# Patient Record
Sex: MALE | Race: WHITE | NOT HISPANIC OR LATINO | URBAN - METROPOLITAN AREA
[De-identification: names, ages, dates, MRNs, and addresses within clinical notes are randomized per-mention and may not be internally consistent; named-entity substitution may affect disease eponyms.]

---

## 2017-10-16 ENCOUNTER — RECORDS - HEALTHEAST (OUTPATIENT)
Dept: LAB | Facility: CLINIC | Age: 52
End: 2017-10-16

## 2017-10-16 LAB
CHOLEST SERPL-MCNC: 143 MG/DL
FASTING STATUS PATIENT QL REPORTED: NORMAL
HDLC SERPL-MCNC: 47 MG/DL
LDLC SERPL CALC-MCNC: 86 MG/DL
PSA SERPL-MCNC: 0.7 NG/ML (ref 0–3.5)
TRIGL SERPL-MCNC: 52 MG/DL

## 2019-08-21 ENCOUNTER — RECORDS - HEALTHEAST (OUTPATIENT)
Dept: LAB | Facility: CLINIC | Age: 54
End: 2019-08-21

## 2019-08-21 LAB
ALBUMIN SERPL-MCNC: 3.7 G/DL (ref 3.5–5)
ALP SERPL-CCNC: 62 U/L (ref 45–120)
ALT SERPL W P-5'-P-CCNC: 23 U/L (ref 0–45)
ANION GAP SERPL CALCULATED.3IONS-SCNC: 9 MMOL/L (ref 5–18)
AST SERPL W P-5'-P-CCNC: 25 U/L (ref 0–40)
BILIRUB SERPL-MCNC: 0.7 MG/DL (ref 0–1)
BUN SERPL-MCNC: 9 MG/DL (ref 8–22)
CALCIUM SERPL-MCNC: 9.6 MG/DL (ref 8.5–10.5)
CHLORIDE BLD-SCNC: 108 MMOL/L (ref 98–107)
CHOLEST SERPL-MCNC: 160 MG/DL
CO2 SERPL-SCNC: 25 MMOL/L (ref 22–31)
CREAT SERPL-MCNC: 0.92 MG/DL (ref 0.7–1.3)
FASTING STATUS PATIENT QL REPORTED: NORMAL
GFR SERPL CREATININE-BSD FRML MDRD: >60 ML/MIN/1.73M2
GLUCOSE BLD-MCNC: 84 MG/DL (ref 70–125)
HDLC SERPL-MCNC: 51 MG/DL
LDLC SERPL CALC-MCNC: 92 MG/DL
POTASSIUM BLD-SCNC: 3.8 MMOL/L (ref 3.5–5)
PROT SERPL-MCNC: 6.8 G/DL (ref 6–8)
PSA SERPL-MCNC: 1.1 NG/ML (ref 0–3.5)
SODIUM SERPL-SCNC: 142 MMOL/L (ref 136–145)
TRIGL SERPL-MCNC: 85 MG/DL

## 2020-08-25 ENCOUNTER — RECORDS - HEALTHEAST (OUTPATIENT)
Dept: LAB | Facility: CLINIC | Age: 55
End: 2020-08-25

## 2020-08-25 ENCOUNTER — HOSPITAL ENCOUNTER (OUTPATIENT)
Dept: LAB | Age: 55
Setting detail: SPECIMEN
Discharge: HOME | End: 2020-08-25

## 2020-08-25 LAB
ALBUMIN SERPL-MCNC: 3.7 G/DL (ref 3.5–5)
ALP SERPL-CCNC: 61 U/L (ref 45–120)
ALT SERPL W P-5'-P-CCNC: 16 U/L (ref 0–45)
ANION GAP SERPL CALCULATED.3IONS-SCNC: 9 MMOL/L (ref 5–18)
AST SERPL W P-5'-P-CCNC: 22 U/L (ref 0–40)
BILIRUB SERPL-MCNC: 0.9 MG/DL (ref 0–1)
BUN SERPL-MCNC: 11 MG/DL (ref 8–22)
CALCIUM SERPL-MCNC: 9.4 MG/DL (ref 8.5–10.5)
CHLORIDE BLD-SCNC: 107 MMOL/L (ref 98–107)
CHOLEST SERPL-MCNC: 161 MG/DL
CO2 SERPL-SCNC: 25 MMOL/L (ref 22–31)
CREAT SERPL-MCNC: 0.85 MG/DL (ref 0.7–1.3)
FASTING STATUS PATIENT QL REPORTED: NORMAL
GFR SERPL CREATININE-BSD FRML MDRD: >60 ML/MIN/1.73M2
GLUCOSE BLD-MCNC: 69 MG/DL (ref 70–125)
HDLC SERPL-MCNC: 41 MG/DL
LDLC SERPL CALC-MCNC: 94 MG/DL
POTASSIUM BLD-SCNC: 4 MMOL/L (ref 3.5–5)
PROT SERPL-MCNC: 6.8 G/DL (ref 6–8)
PSA SERPL-MCNC: 1.3 NG/ML (ref 0–3.5)
SODIUM SERPL-SCNC: 141 MMOL/L (ref 136–145)
TRIGL SERPL-MCNC: 129 MG/DL

## 2020-08-26 LAB — COVID-19 ANTIBODY IGG: NEGATIVE

## 2020-11-20 RX ORDER — CETIRIZINE HYDROCHLORIDE 10 MG/1
10 TABLET ORAL DAILY
COMMUNITY

## 2020-11-20 ASSESSMENT — MIFFLIN-ST. JEOR: SCORE: 2454.25

## 2020-11-20 NOTE — PROGRESS NOTES
"Sergio Long is a 55 year old male who is being evaluated via a billable telephone visit.      The patient has been notified of following:     \"This telephone visit will be conducted via a call between you and your physician/provider. We have found that certain health care needs can be provided without the need for a physical exam.  This service lets us provide the care you need with a short phone conversation.  If a prescription is necessary we can send it directly to your pharmacy.  If lab work is needed we can place an order for that and you can then stop by our lab to have the test done at a later time.    Telephone visits are billed at different rates depending on your insurance coverage. During this emergency period, for some insurers they may be billed the same as an in-person visit.  Please reach out to your insurance provider with any questions.    If during the course of the call the physician/provider feels a telephone visit is not appropriate, you will not be charged for this service.\"    Patient has given verbal consent for Telephone visit?  Yes    What phone number would you like to be contacted at? [phone]    How would you like to obtain your AVS? Mail a copy    Virtual visit to establish care for history of obstructive sleep apnea managed with CPAP, would need an interest in replacement parts and equipment.    Assessment:  -Moderate obstructive sleep apnea without sleep associated hypoxemia  -Morbid obesity    Plan:  -Clinically, his obstructive sleep apnea appears very well controlled with CPAP auto titrate 8-12.6 cm H2O and he is noting significant benefit in using on a nightly basis.  -I did not see a strong need for repeat sleep testing at this time given that his weight is actually decreased since his split-night PSG in November 2015.  -Orders placed for replacement CPAP on same settings and new supplies.  -Plan for follow-up in 1-2 years.    55-year-old male with past medical history of " "obstructive sleep apnea, basal cell carcinoma of the neck, malignant melanoma of the trunk.    Last office visit with sleep medicine was at the [facility] on January 21, 2016.  He was noted that he was initially diagnosed in 2009 had been on a CPAP of 10 cm of water pressure.  He had a repeat titration PSG and was interested in restarting CPAP on a pressure of 9 cm H2O.  It seemed to be very effective with him sleeping better, feeling more rested, possibly some mild residual snoring.  Plan to adjust his machine to auto titrate 8-12 cm H2O and follow-up in 1 year along with weight management (weight 345 pounds).    Today, he presents to establish care in order to get replacement supplies and also interested in getting an replacement CPAP machine.  Overall he feels he continues to sleep very well, feels very well rested, no residual snoring or apnea.  He is overall very happy with that and feels it is a \"life changer\".    CPAP download reviewed from October 21, 2020 through November 19, 2020 on auto titrate 8-12.6 cm H2O.  Used 30 out of 30 days.  Average daily usage 6 hours and 35 minutes.  Pressure median 9.6, 95th percentile 12.3 cm H2O.  AHI 0.3.  Leak 95th percentile 2.3 L/min.    Per patient most recent BMI 39.1.    Prior sleep testing:  -Split-night PSG on November 4, 2015.  BMI 41.3.  AHI 19.  CPAP titrated from 5 up to 9 cm of water.  No PLM's observed.    10 point ROS of systems including Constitutional, Eyes, Respiratory, Cardiovascular, Gastroenterology, Genitourinary, Integumentary, Muscularskeletal, Psychiatric were all negative except for pertinent positives noted in my HPI.    Phone call duration: 25 minutes    Pedro Zambrano MA      "

## 2020-11-23 ENCOUNTER — VIRTUAL VISIT (OUTPATIENT)
Dept: SLEEP MEDICINE | Facility: CLINIC | Age: 55
End: 2020-11-23
Payer: COMMERCIAL

## 2020-11-23 VITALS — HEIGHT: 77 IN | BODY MASS INDEX: 37.19 KG/M2 | WEIGHT: 315 LBS

## 2020-11-23 DIAGNOSIS — G47.33 OSA (OBSTRUCTIVE SLEEP APNEA): Primary | ICD-10-CM

## 2020-11-23 PROBLEM — C43.59: Status: ACTIVE | Noted: 2020-11-23

## 2020-11-23 PROBLEM — C44.41 BASAL CELL CARCINOMA OF NECK: Status: ACTIVE | Noted: 2020-11-23

## 2020-11-23 PROCEDURE — 99202 OFFICE O/P NEW SF 15 MIN: CPT | Mod: 95 | Performed by: FAMILY MEDICINE

## 2020-11-23 ASSESSMENT — MIFFLIN-ST. JEOR: SCORE: 2454.37

## 2020-11-23 NOTE — PATIENT INSTRUCTIONS
Your BMI is Body mass index is 39.12 kg/m .  Weight management is a personal decision.  If you are interested in exploring weight loss strategies, the following discussion covers the approaches that may be successful. Body mass index (BMI) is one way to tell whether you are at a healthy weight, overweight, or obese. It measures your weight in relation to your height.  A BMI of 18.5 to 24.9 is in the healthy range. A person with a BMI of 25 to 29.9 is considered overweight, and someone with a BMI of 30 or greater is considered obese. More than two-thirds of American adults are considered overweight or obese.  Being overweight or obese increases the risk for further weight gain. Excess weight may lead to heart disease and diabetes.  Creating and following plans for healthy eating and physical activity may help you improve your health.  Weight control is part of healthy lifestyle and includes exercise, emotional health, and healthy eating habits. Careful eating habits lifelong are the mainstay of weight control. Though there are significant health benefits from weight loss, long-term weight loss with diet alone may be very difficult to achieve- studies show long-term success with dietary management in less than 10% of people. Attaining a healthy weight may be especially difficult to achieve in those with severe obesity. In some cases, medications, devices and surgical management might be considered.  What can you do?  If you are overweight or obese and are interested in methods for weight loss, you should discuss this with your provider.     Consider reducing daily calorie intake by 500 calories.     Keep a food journal.     Avoiding skipping meals, consider cutting portions instead.    Diet combined with exercise helps maintain muscle while optimizing fat loss. Strength training is particularly important for building and maintaining muscle mass. Exercise helps reduce stress, increase energy, and improves fitness.  Increasing exercise without diet control, however, may not burn enough calories to loose weight.       Start walking three days a week 10-20 minutes at a time    Work towards walking thirty minutes five days a week     Eventually, increase the speed of your walking for 1-2 minutes at time    In addition, we recommend that you review healthy lifestyles and methods for weight loss available through the National Institutes of Health patient information sites:  http://win.niddk.nih.gov/publications/index.htm    And look into health and wellness programs that may be available through your health insurance provider, employer, local community center, or becky club.    Weight management plan: Patient was referred to their PCP to discuss a diet and exercise plan.

## 2020-12-14 ENCOUNTER — OFFICE VISIT - HEALTHEAST (OUTPATIENT)
Dept: UROLOGY | Facility: CLINIC | Age: 55
End: 2020-12-14

## 2020-12-14 DIAGNOSIS — N13.2 HYDRONEPHROSIS WITH URINARY OBSTRUCTION DUE TO URETERAL CALCULUS: ICD-10-CM

## 2020-12-14 DIAGNOSIS — N20.0 CALCULUS OF KIDNEY: ICD-10-CM

## 2020-12-14 DIAGNOSIS — N20.1 CALCULUS OF URETER: ICD-10-CM

## 2020-12-18 ENCOUNTER — DOCUMENTATION ONLY (OUTPATIENT)
Dept: SLEEP MEDICINE | Facility: CLINIC | Age: 55
End: 2020-12-18

## 2020-12-18 NOTE — PROGRESS NOTES
Patient was offered choice of vendor and chose [facility].  Patient Sergio Long was set up at [facility] on December 18, 2020. Patient received a Resmed AirSense 10 Auto. Pressures were set at 8-12.6 cm H2O.   Patient s ramp is 4 cm H2O for Auto and FLEX/EPR is EPR, 2.  Patient received a [employer] Mask name: Vitera  Full Face mask size Large, heated tubing and heated humidifier.  Patient does not need to meet compliance. Patient has a follow up not scheduled with Dr. Wagner.    Radha Smith

## 2020-12-21 ENCOUNTER — DOCUMENTATION ONLY (OUTPATIENT)
Dept: SLEEP MEDICINE | Facility: CLINIC | Age: 55
End: 2020-12-21

## 2020-12-21 NOTE — PROGRESS NOTES
3 DAY STM VISIT      Confirmed with patient at time of call- N/A Patient is still interested in STM service     Data only recheck    Replacement device: Yes  STM ordered by provider: Yes     Device type: Auto-CPAP  PAP settings from order::  CPAP min 8 cm  H20       CPAP max 12.6 cm  H20        Mask type:    Nasal Mask     Device settings from machine      Min CPAP 8.0            Max CPAP 12.6             EPR level Setting: TWO      RESMED Soft response setting:  OFF  Assessment: Nightly usage over four hours.  Action plan: Patient removed from STM, STM not required or ordered.       Total time spent on accessing and  interpreting remote patient PAP therapy data  10 minutes  Total time spent counseling, coaching  and reviewing PAP therapy data with patient  0 minutes  [id number] no

## 2020-12-22 ENCOUNTER — COMMUNICATION - HEALTHEAST (OUTPATIENT)
Dept: UROLOGY | Facility: CLINIC | Age: 55
End: 2020-12-22

## 2020-12-23 ENCOUNTER — AMBULATORY - HEALTHEAST (OUTPATIENT)
Dept: UROLOGY | Facility: CLINIC | Age: 55
End: 2020-12-23

## 2020-12-23 ENCOUNTER — COMMUNICATION - HEALTHEAST (OUTPATIENT)
Dept: UROLOGY | Facility: CLINIC | Age: 55
End: 2020-12-23

## 2020-12-23 DIAGNOSIS — N20.0 CALCULUS OF KIDNEY: ICD-10-CM

## 2020-12-28 ENCOUNTER — HOSPITAL ENCOUNTER (OUTPATIENT)
Dept: ULTRASOUND IMAGING | Facility: CLINIC | Age: 55
Discharge: HOME | End: 2020-12-28
Attending: SPECIALIST

## 2020-12-28 DIAGNOSIS — N20.0 CALCULUS OF KIDNEY: ICD-10-CM

## 2020-12-30 ENCOUNTER — OFFICE VISIT - HEALTHEAST (OUTPATIENT)
Dept: UROLOGY | Facility: CLINIC | Age: 55
End: 2020-12-30

## 2020-12-30 DIAGNOSIS — N20.1 CALCULUS OF URETER: ICD-10-CM

## 2020-12-30 DIAGNOSIS — R31.29 OTHER MICROSCOPIC HEMATURIA: ICD-10-CM

## 2020-12-30 DIAGNOSIS — N20.0 CALCULUS OF KIDNEY: ICD-10-CM

## 2020-12-30 DIAGNOSIS — N28.89 RENAL MASS: ICD-10-CM

## 2021-01-05 ENCOUNTER — HOSPITAL ENCOUNTER (OUTPATIENT)
Dept: CT IMAGING | Facility: CLINIC | Age: 56
Discharge: HOME | End: 2021-01-05
Attending: SPECIALIST

## 2021-01-05 DIAGNOSIS — N28.89 RENAL MASS: ICD-10-CM

## 2021-01-05 DIAGNOSIS — N20.1 CALCULUS OF URETER: ICD-10-CM

## 2021-01-06 ENCOUNTER — OFFICE VISIT - HEALTHEAST (OUTPATIENT)
Dept: UROLOGY | Facility: CLINIC | Age: 56
End: 2021-01-06

## 2021-01-06 DIAGNOSIS — N20.0 CALCULUS OF KIDNEY: ICD-10-CM

## 2021-01-06 DIAGNOSIS — R35.0 URINARY FREQUENCY: ICD-10-CM

## 2021-01-06 DIAGNOSIS — N20.1 CALCULUS OF URETER: ICD-10-CM

## 2021-01-12 ENCOUNTER — COMMUNICATION - HEALTHEAST (OUTPATIENT)
Dept: UROLOGY | Facility: CLINIC | Age: 56
End: 2021-01-12

## 2021-01-25 ENCOUNTER — COMMUNICATION - HEALTHEAST (OUTPATIENT)
Dept: UROLOGY | Facility: CLINIC | Age: 56
End: 2021-01-25

## 2021-01-25 DIAGNOSIS — N20.1 CALCULUS OF URETER: ICD-10-CM

## 2021-03-07 ENCOUNTER — RECORDS - HEALTHEAST (OUTPATIENT)
Dept: ADMINISTRATIVE | Facility: OTHER | Age: 56
End: 2021-03-07

## 2021-03-26 ENCOUNTER — OFFICE VISIT - HEALTHEAST (OUTPATIENT)
Dept: UROLOGY | Facility: CLINIC | Age: 56
End: 2021-03-26

## 2021-03-26 DIAGNOSIS — N20.0 CALCULUS OF KIDNEY: ICD-10-CM

## 2021-03-26 DIAGNOSIS — Z87.442 HISTORY OF KIDNEY STONES: ICD-10-CM

## 2021-03-26 DIAGNOSIS — R82.992 HYPEROXALURIA: ICD-10-CM

## 2021-03-26 DIAGNOSIS — N23 RENAL COLIC: ICD-10-CM

## 2021-03-26 DIAGNOSIS — R82.998 HIGH URINE SODIUM: ICD-10-CM

## 2021-03-26 DIAGNOSIS — R82.994 HYPERCALCIURIA: ICD-10-CM

## 2021-03-29 ENCOUNTER — SURGERY - HEALTHEAST (OUTPATIENT)
Dept: UROLOGY | Facility: CLINIC | Age: 56
End: 2021-03-29

## 2021-03-29 DIAGNOSIS — N20.0 CALCULUS OF KIDNEY: ICD-10-CM

## 2021-03-30 ENCOUNTER — COMMUNICATION - HEALTHEAST (OUTPATIENT)
Dept: LAB | Facility: CLINIC | Age: 56
End: 2021-03-30

## 2021-03-31 ENCOUNTER — AMBULATORY - HEALTHEAST (OUTPATIENT)
Dept: SURGERY | Facility: AMBULATORY SURGERY CENTER | Age: 56
End: 2021-03-31

## 2021-03-31 DIAGNOSIS — Z11.59 ENCOUNTER FOR SCREENING FOR OTHER VIRAL DISEASES: ICD-10-CM

## 2021-04-06 ENCOUNTER — AMBULATORY - HEALTHEAST (OUTPATIENT)
Dept: LAB | Facility: CLINIC | Age: 56
End: 2021-04-06

## 2021-04-06 DIAGNOSIS — Z11.59 ENCOUNTER FOR SCREENING FOR OTHER VIRAL DISEASES: ICD-10-CM

## 2021-04-07 LAB
SARS-COV-2 PCR COMMENT: NORMAL
SARS-COV-2 RNA SPEC QL NAA+PROBE: NEGATIVE
SARS-COV-2 VIRUS SPECIMEN SOURCE: NORMAL

## 2021-04-07 ASSESSMENT — MIFFLIN-ST. JEOR: SCORE: 2449.25

## 2021-04-08 ENCOUNTER — ANESTHESIA - HEALTHEAST (OUTPATIENT)
Dept: SURGERY | Facility: AMBULATORY SURGERY CENTER | Age: 56
End: 2021-04-08

## 2021-04-08 ENCOUNTER — COMMUNICATION - HEALTHEAST (OUTPATIENT)
Dept: SCHEDULING | Facility: CLINIC | Age: 56
End: 2021-04-08

## 2021-04-09 ENCOUNTER — SURGERY - HEALTHEAST (OUTPATIENT)
Dept: SURGERY | Facility: AMBULATORY SURGERY CENTER | Age: 56
End: 2021-04-09

## 2021-04-09 ASSESSMENT — MIFFLIN-ST. JEOR: SCORE: 2449.25

## 2021-04-14 ENCOUNTER — AMBULATORY - HEALTHEAST (OUTPATIENT)
Dept: UROLOGY | Facility: CLINIC | Age: 56
End: 2021-04-14

## 2021-04-14 DIAGNOSIS — N20.0 CALCULUS OF KIDNEY: ICD-10-CM

## 2021-04-15 ENCOUNTER — AMBULATORY - HEALTHEAST (OUTPATIENT)
Dept: UROLOGY | Facility: CLINIC | Age: 56
End: 2021-04-15

## 2021-05-01 ENCOUNTER — HEALTH MAINTENANCE LETTER (OUTPATIENT)
Age: 56
End: 2021-05-01

## 2021-05-06 ENCOUNTER — HOSPITAL ENCOUNTER (OUTPATIENT)
Dept: CT IMAGING | Facility: CLINIC | Age: 56
Discharge: HOME | End: 2021-05-06
Attending: PHYSICIAN ASSISTANT
Payer: COMMERCIAL

## 2021-05-06 DIAGNOSIS — N20.1 CALCULUS OF URETER: ICD-10-CM

## 2021-05-07 ENCOUNTER — OFFICE VISIT - HEALTHEAST (OUTPATIENT)
Dept: UROLOGY | Facility: CLINIC | Age: 56
End: 2021-05-07

## 2021-05-07 DIAGNOSIS — R82.994 HYPERCALCIURIA: ICD-10-CM

## 2021-05-07 DIAGNOSIS — N28.89 CALYCEAL DIVERTICULUM: ICD-10-CM

## 2021-05-07 DIAGNOSIS — Z87.442 HISTORY OF KIDNEY STONES: ICD-10-CM

## 2021-05-07 DIAGNOSIS — N20.0 CALCULUS OF KIDNEY: ICD-10-CM

## 2021-05-07 DIAGNOSIS — R82.998 HIGH URINE SODIUM: ICD-10-CM

## 2021-05-07 DIAGNOSIS — R82.992 HYPEROXALURIA: ICD-10-CM

## 2021-05-19 ENCOUNTER — RECORDS - HEALTHEAST (OUTPATIENT)
Dept: SURGERY | Facility: CLINIC | Age: 56
End: 2021-05-19

## 2021-05-19 ENCOUNTER — AMBULATORY - HEALTHEAST (OUTPATIENT)
Dept: LAB | Facility: CLINIC | Age: 56
End: 2021-05-19

## 2021-05-19 DIAGNOSIS — Z87.442 HISTORY OF KIDNEY STONES: ICD-10-CM

## 2021-05-19 DIAGNOSIS — N28.89 CALYCEAL DIVERTICULUM: ICD-10-CM

## 2021-05-19 LAB
ALBUMIN UR-MCNC: NEGATIVE G/DL
APPEARANCE UR: CLEAR
BACTERIA #/AREA URNS HPF: NORMAL /[HPF]
BILIRUB UR QL STRIP: NEGATIVE
COLOR UR AUTO: YELLOW
GLUCOSE UR STRIP-MCNC: NEGATIVE MG/DL
HGB UR QL STRIP: NEGATIVE
KETONES UR STRIP-MCNC: NEGATIVE MG/DL
LEUKOCYTE ESTERASE UR QL STRIP: NEGATIVE
NITRATE UR QL: NEGATIVE
PH UR STRIP: 7 [PH] (ref 5–8)
RBC #/AREA URNS AUTO: NORMAL HPF
SP GR UR STRIP: 1.02 (ref 1–1.03)
SQUAMOUS #/AREA URNS AUTO: NORMAL LPF
UROBILINOGEN UR STRIP-ACNC: NORMAL
WBC #/AREA URNS AUTO: NORMAL HPF

## 2021-06-05 VITALS — BODY MASS INDEX: 37.19 KG/M2 | WEIGHT: 315 LBS | HEIGHT: 77 IN

## 2021-06-13 NOTE — PATIENT INSTRUCTIONS - HE
Patient Stated Goal: Pass my stone  Symptom Control While Passing A Stone    The goal of [facility] is to let a smaller kidney stone (less than 4 to 5 mm) pass without intervention if possible. Giving your body a chance to clear the stone may take a few hours up to a few weeks.  Keeping you well-informed, safe and fairly comfortable is important.    Drink to thirst  Do not attempt to  flush out  your stone by drinking too much fluid. This does not work and may increase nausea. Drink enough to satisfy your body s thirst. Eating your normal diet is fine.   Medications (that may be suggested or prescribed)    Ibuprofen (Advil or Motrin) Available over the counter  o Take two (200mg) tablets every six hours until the stone passes.  o Prevents spasm of the ureter.    o Decreases pain.      Dramamine* (drowsy version, non-generic formulation) Available over the counter  o Take 50mg at bedtime  o Decreases spasms of the ureter  o Decreases nausea  o Decreases acute pain  o Decreases recurrence of pain for 24 hours  o Will help you sleep  *This medication will cause increase drowsiness, do not drive or operate machinery for 6 hours.      Narcotics (Percocet, Vicodin, Dilaudid) Take as prescribed for severe pain unrelieved by ibuprofen and Dramamine  o Narcotics have significant side effects and only  cover-up  pain. They have no effect on the cause of pain.  o Common side effects  - Confusion, disorientation and sedation - DO NOT DRIVE OR OPERATE MACHINERY WITHIN 24 HOURS  - Nausea - take Dramamine or Zofran or Haldol to help control  - Constipation  - Sleep disturbances      Ondansetron (Zofran) Take as prescribed  o Reserve for severe nausea  o May cause constipation, start over the counter Miralax if needed      Second Line Anti-Nausea Medication: Adding a different anti-nausea medication maybe helpful for persistent nausea.  The combined effect of different types of anti-nausea medications maybe more  effective than either medication by itself, even in higher doses.  o Compazine: Take as prescribed      Information about kidney stones    Crystals can form if chemicals are too concentrated in your urine. If the crystal grows over time, a stone may form. A stone usually isn t painful while it is still in the kidney.    As the stone begins to leave the kidney, you may experience episodes of flank pain as the kidney stone approaches the entrance to the ureter. Some people feel a vague ache in the side.    Kidney stones may fall into the ureter. Some stones are tiny and pass through without causing symptoms. The ureter is a small tube (approximately 1/8 of an inch wide). A kidney stone can get stuck and block the ureter. If this happens, urine backs up and flows back to the kidney. Back pressure on the kidney can cause:  o Severe flank pain radiating to the groin.  o Severe nausea and vomiting.  o The pain can occur in the lower back, side, groin or all three.      When the stone reaches the lower ureter, this can irritate the bladder and sensations of feeling the urge to urinate frequently and urgently may occur.      Once the stone passes out of your ureter and into your bladder, the symptoms of urgency and frequency will often disappear. Sometimes pain will come back for a short period and will not be as severe as before. The passage of the stone from your bladder and out of your body is usually not a problem. The urethra is at least twice as wide as the normal ureter, so the stone doesn t usually block it.    Strain all urine  If you pass the stone, save it. Place it in the container we have provided and bring it to the [facility] within a week of passing it. Your stone will then be sent for analysis which takes about a month.     Signs and symptoms you might experience    Nausea    Decreased appetite    Urinary frequency    Bloody urine     Chills    Fatigue    When to call [facility] or  go to the Emergency Room    Fever with a temperature greater than 100.1    Severe pain    Persistent nausea/vomiting    If the pain worsens or nausea/vomiting is uncontrolled with medications, STOP eating & drinking. You need to have an empty stomach for 8 hours prior to surgery. Call the [facility] immediately at [phone].           Follow-up    Low dose CT scan with doctor visit 1-2 weeks after initial clinic visit per doctor s instructions    Please cancel the CT scan visit if you pass a stone. Reschedule for a one month follow-up with doctor to discuss stone composition and future prevention.    Preventing future stones    Approximately a month after your stone is sent out for analysis, a prevention visit will occur with your provider, to discuss an individualized plan for prevention of new stones and to discuss managing stones that you may still have. Along with the analysis of the kidney stone, blood and urine tests may be indicated to develop this plan. Knowing the type of kidney stones you make, and why, allows the providers at the [facility] to recommend specific ways to prevent them.    Follow-up visits are an important part of monitoring and preventing future re-occurrences.    The [facility] is available for questions or concerns 24 hours a day at [phone]

## 2021-06-13 NOTE — TELEPHONE ENCOUNTER
[facility] clinical support following up on ureteral stone trial of passage.  Patient states he was having some significant pain a few days after his last appointment encounter but that has finally settled.  He is feeling rather well now.  No stone retrieved as of yet.  He has an upcoming follow up appointment with CT but his health coverage is denying coverage.  Will have provider put in a different covered imaging as necessary and schedulers will contact patient with changes.

## 2021-06-13 NOTE — PROGRESS NOTES
"Assessment/Plan:        Diagnoses and all orders for this visit:    Calculus of ureter  -     Symptom Control While Passing a Stone Education  -     CT Abdomen Pelvis Without Oral Without IV Contrast; Future; Expected date: 12/28/2020  -     Patient Stated Goal: Pass my stone  -     tamsulosin (FLOMAX) 0.4 mg cap; Take 1 capsule (0.4 mg total) by mouth daily for 14 days. With meal  Dispense: 14 capsule; Refill: 1    Hydronephrosis with urinary obstruction due to ureteral calculus    Calculus of kidney    Stone Management Plan  [facility] 12/14/2020   Urinary Tract Infection No suspicion of infection   Renal Colic Asymptomatic at this time   Renal Failure No suspicion of renal failure   Current CT date 12/13/2020   Right sided stones? Yes   R Number of ureteral stones 1   R GSD of ureteral stones 4   R Location of ureteral stone Proximal   R Number of kidney stones  1   R GSD of kidney stones 10 - 15   R Hydronephrosis Moderate   R Stone Event New event   Diagnosis date 12/13/2020   Initial location of primary symptomatic stone Proximal   Initial GSD of primary symptomatic stone 4   R MET status Initiation   R Current Plan MET   MET 2 week F/U   Left sided stones? Yes   L Number of ureteral stones No ureteral stones   L Number of kidney stones  1   L GSD of kidney stones 2 - 4   L Hydronephrosis None   L Stone Event No current event   L Current Plan Observe   Observe rationale Limited stone burden with good prognosis for spontaneous passage         Phone call duration: 23 minutes    Genia Rebolledo PA-C    Subjective:        The patient has been notified of following:     \"This telephone visit will be conducted via a call between you and your physician/provider. We have found that certain health care needs can be provided without the need for a physical exam.  This service lets us provide the care you need with a short phone conversation. If a prescription is necessary, we can send it directly to your " "pharmacy.  If labs and/or imaging are needed, we can place orders so that you can have the test (s) done at a later time.    If during the course of the call the physician/provider feels a telephone visit is not appropriate, you will not be charged for this service.\"     HPI  Mr. Sergio Long is a 55 y.o.  male who is being evaluated via a billable telephone visit by [facility] following [facility] ER visit for urolithiasis.    He is a first time stone former. He has no identified modifiable stone risk factors. He has no identified non-modifiable stone risk factors.    He was seen in ER yesterday for acute onset right flank pain x 2 hours in duration. The pain was 10/10 and sharp without radiation. He could alleviate the pain by momentarily repositioning. He had associated nausea and reported hematuria 1 week ago that resolved. Workup was notable for CT reporting an obstructing right ureteral stone, bilateral renal stones and indeterminate right renal cystic lesion. He was sent home with dilaudid.    He is asymptomatic at present. He denies symptoms of fever, chills, flank pain, nausea, vomiting, urinary frequency and dysuria.     CT scan from 12/13/20 is personally reviewed and demonstrates a moderately obstructing 4 mm right proximal ureteral stone. Additional, nonobstructing 12 mm right renal stone and 2 mm left renal stone. Radiologist notes an ovoid 4 cm cystic lesion along anterior aspect of right kidney.    Significant labs from presentation include severe hematuria, no pyuria, negative nitrite, no bacteria, normal WBC, normal C reactive protein, normal creatinine and normal potassium.    PLAN    54 yo M with obstructing right proximal ureteral stone. Bilateral renal stones. Probably complex right renal cyst.    Will proceed with medical expulsive therapy. Risks and benefits were detailed of medical expulsive therapy including probability of stone passage, recurrent renal " colic, and requirement of emergency medical and/or surgical care and further imaging. Patient verbalized understanding. Patient agrees with plan as discussed. He will return in 2 weeks with low dose CT scan.    For symptom control, he was prescribed flomax. He has dilaudid. Over the counter symptom control medications of ibuprofen, Dramamine and Tylenol were recommended.     ROS   A 12 point comprehensive review of systems is negative except for HPI    Past Medical History:   Diagnosis Date     Sleep apnea        No past surgical history on file.    Current Outpatient Medications   Medication Sig Dispense Refill     CETIRIZINE HCL (ZYRTEC ORAL) Take 1 tablet by mouth daily as needed.       dimenhyDRINATE (DRAMAMINE) 50 MG tablet Take 1 tablet (50 mg total) by mouth at bedtime for 7 days. 7 tablet 0     dimenhyDRINATE (DRAMAMINE) 50 MG tablet Take 1 tablet (50 mg total) by mouth 4 (four) times a day as needed. 28 tablet 0     HYDROmorphone (DILAUDID) 2 MG tablet Take 1-2 tablets (2-4 mg total) by mouth every 4 (four) hours as needed for pain. 11 tablet 0     ibuprofen (ADVIL,MOTRIN) 200 MG tablet Take 2 tablets (400 mg total) by mouth 4 (four) times a day for 7 days. 56 tablet 0     tamsulosin (FLOMAX) 0.4 mg cap Take 1 capsule (0.4 mg total) by mouth daily for 14 days. With meal 14 capsule 1     No current facility-administered medications for this visit.        No Known Allergies    Social History     Socioeconomic History     Marital status:      Spouse name: Not on file     Number of children: Not on file     Years of education: Not on file     Highest education level: Not on file   Occupational History     Not on file   Social Needs     Financial resource strain: Not on file     Food insecurity     Worry: Not on file     Inability: Not on file     Transportation needs     Medical: Not on file     Non-medical: Not on file   Tobacco Use     Smoking status: Former Smoker     Types: Cigarettes     Smokeless  tobacco: Current User     Types: Chew   Substance and Sexual Activity     Alcohol use: No     Drug use: Not on file     Sexual activity: Not on file   Lifestyle     Physical activity     Days per week: Not on file     Minutes per session: Not on file     Stress: Not on file   Relationships     Social connections     Talks on phone: Not on file     Gets together: Not on file     Attends Gnosticism service: Not on file     Active member of club or organization: Not on file     Attends meetings of clubs or organizations: Not on file     Relationship status: Not on file     Intimate partner violence     Fear of current or ex partner: Not on file     Emotionally abused: Not on file     Physically abused: Not on file     Forced sexual activity: Not on file   Other Topics Concern     Not on file   Social History Narrative     Not on file       Family History   Problem Relation Age of Onset     Sleep apnea Father      Snoring Father      Sleep apnea Sister      Snoring Sister      Sleep apnea Sister      Snoring Sister      Sleep apnea Sister      Snoring Sister        Objective:      Physical Exam  There were no vitals filed for this visit.    Labs    Urinalysis POC (Office):  Nitrite, UA   Date Value Ref Range Status   12/13/2020 Negative Negative Final       Lab Urinalysis:  Blood, UA   Date Value Ref Range Status   12/13/2020 Large (!) Negative Final     Nitrite, UA   Date Value Ref Range Status   12/13/2020 Negative Negative Final     Leukocytes, UA   Date Value Ref Range Status   12/13/2020 Negative Negative Final     pH, UA   Date Value Ref Range Status   12/13/2020 7.0 4.5 - 8.0 Final    and Acute Labs   CBC   WBC   Date Value Ref Range Status   12/13/2020 5.7 4.0 - 11.0 thou/uL Final     Hemoglobin   Date Value Ref Range Status   12/13/2020 13.5 (L) 14.0 - 18.0 g/dL Final     Platelets   Date Value Ref Range Status   12/13/2020 242 140 - 440 thou/uL Final   , C Reactive Protein    CRP   Date Value Ref Range Status    12/13/2020 0.3 0.0 - 0.8 mg/dL Final    and Renal Panel  [facility]  Creatinine   Date Value Ref Range Status   12/13/2020 0.94 0.70 - 1.30 mg/dL Final   08/25/2020 0.85 0.70 - 1.30 mg/dL Final   08/21/2019 0.92 0.70 - 1.30 mg/dL Final     Potassium   Date Value Ref Range Status   12/13/2020 3.6 3.5 - 5.0 mmol/L Final   08/25/2020 4.0 3.5 - 5.0 mmol/L Final   08/21/2019 3.8 3.5 - 5.0 mmol/L Final     Calcium   Date Value Ref Range Status   12/13/2020 9.2 8.5 - 10.5 mg/dL Final   08/25/2020 9.4 8.5 - 10.5 mg/dL Final   08/21/2019 9.6 8.5 - 10.5 mg/dL Final

## 2021-06-14 NOTE — TELEPHONE ENCOUNTER
Message left for patient regarding vm message on [facility] line regarding passing his stone.  Analysis order put in.  Neema Portillo RN

## 2021-06-14 NOTE — TELEPHONE ENCOUNTER
Order faxed to [facility] to send pt 2 24 hour urine kits.  Neema Portillo RN

## 2021-06-14 NOTE — PROGRESS NOTES
"Assessment/Plan:        Diagnoses and all orders for this visit:    Calculus of kidney    Calculus of ureter  -     CT Abdomen Pelvis Without Oral With Without IV Contrast; Future; Expected date: 01/06/2021    Other microscopic hematuria    Renal mass  -     CT Abdomen Pelvis Without Oral With Without IV Contrast; Future; Expected date: 01/06/2021      Stone Management Plan  [facility] [place] Stone Management 12/14/2020 12/30/2020   Urinary Tract Infection No suspicion of infection No suspicion of infection   Renal Colic Asymptomatic at this time Asymptomatic at this time   Renal Failure No suspicion of renal failure No suspicion of renal failure   Current CT date 12/13/2020 -   Right sided stones? Yes -   R Number of ureteral stones 1 -   R GSD of ureteral stones 4 -   R Location of ureteral stone Proximal -   R Number of kidney stones  1 -   R GSD of kidney stones 10 - 15 -   R Hydronephrosis Moderate -   R Stone Event New event Established event   Diagnosis date 12/13/2020 -   Initial location of primary symptomatic stone Proximal -   Initial GSD of primary symptomatic stone 4 -   R MET status Initiation -   R Current Plan MET -   MET 2 week F/U -   Left sided stones? Yes -   L Number of ureteral stones No ureteral stones -   L Number of kidney stones  1 -   L GSD of kidney stones 2 - 4 -   L Hydronephrosis None -   L Stone Event No current event No current event   L Current Plan Observe -   Observe rationale Limited stone burden with good prognosis for spontaneous passage -             Phone call duration: 12 minutes    Dominguez Palmer MD     Subjective:      The patient has been notified of following:     \"This telephone visit will be conducted via a call between you and your physician/provider. We have found that certain health care needs can be provided without the need for a physical exam.  This service lets us provide the care you need with a short phone conversation.  If a prescription is necessary we can send it " "directly to your pharmacy.  If labs and/or imaging are needed, we can place orders so you can have the test (s) done at a later time.    If during the course of the call the physician/provider feels a telephone visit is not appropriate, you will not be charged for this service.\"     HPI  Mr. Sergio Long is a 55 y.o.  male who is being evaluated via a billable telephone visit by [facility] [place] for follow-up of a 4 mm right proximal ureteral stone on the right diagnosed 12/13/2020.  In addition there is a 12 mm stone in the right kidney nonobstructing and a 2 mm stone in the left kidney nonobstructing.  There is a 4 cm cystic mass that appears to be a complex cyst but cannot rule out neoplasia in the right kidney upper pole.  This was noted by radiology as well.  Patient comes at this time 12/30/2020 having had an ultrasound on 12/28/2020 showing no hydro on either side.  There were no jets seen on the right there was a jet seen on the left.  There was a calcified lesion in the bladder that was either in the orifice on the right or in the bladder.  It appeared larger than what they saw on the ureteral stone.  Jets were seen on the left.    Renal mass had Hounsfield numbers 7-10.    Patient is having no flank pain at this point and has had none for a week or so.  He has noted a feeling of having to void after he is voided but may be an indication that the stone was in the intramural tunnel.  He thinks it is a little worse than it has been over the remote past as he has a sensation of this all the time to a degree.    Impression right ureteral stone either in the intramural tunnel or bladder at this point with known right renal stone 12 mm in size and 2 mm stone in the left kidney  Mass lesion right upper pole indeterminate probably complex cyst need to rule out neoplasia    Plan;; will obtain CT without and with IV contrast with delayed cuts that will answer the question as to the " whereabouts of the right ureteral stone as well as define the mass lesion in the right upper pole.    The large stone in the right kidney may have to be addressed if the cystic mass turns out to be benign.  In addition stone risk studies would be in order in view of his age.          ROS   Review of systems is negative except for HPI.    Past Medical History:   Diagnosis Date     Sleep apnea        No past surgical history on file.    Current Outpatient Medications   Medication Sig Dispense Refill     CETIRIZINE HCL (ZYRTEC ORAL) Take 1 tablet by mouth daily as needed.       HYDROmorphone (DILAUDID) 2 MG tablet Take 1-2 tablets (2-4 mg total) by mouth every 4 (four) hours as needed for pain. 11 tablet 0     No current facility-administered medications for this visit.        No Known Allergies    Social History     Socioeconomic History     Marital status:      Spouse name: Not on file     Number of children: Not on file     Years of education: Not on file     Highest education level: Not on file   Occupational History     Not on file   Social Needs     Financial resource strain: Not on file     Food insecurity     Worry: Not on file     Inability: Not on file     Transportation needs     Medical: Not on file     Non-medical: Not on file   Tobacco Use     Smoking status: Former Smoker     Types: Cigarettes     Smokeless tobacco: Current User     Types: Chew   Substance and Sexual Activity     Alcohol use: No     Drug use: Not on file     Sexual activity: Not on file   Lifestyle     Physical activity     Days per week: Not on file     Minutes per session: Not on file     Stress: Not on file   Relationships     Social connections     Talks on phone: Not on file     Gets together: Not on file     Attends Scientologist service: Not on file     Active member of club or organization: Not on file     Attends meetings of clubs or organizations: Not on file     Relationship status: Not on file     Intimate partner violence      Fear of current or ex partner: Not on file     Emotionally abused: Not on file     Physically abused: Not on file     Forced sexual activity: Not on file   Other Topics Concern     Not on file   Social History Narrative     Not on file       Family History   Problem Relation Age of Onset     Sleep apnea Father      Snoring Father      Sleep apnea Sister      Snoring Sister      Sleep apnea Sister      Snoring Sister      Sleep apnea Sister      Snoring Sister        Objective:      Labs

## 2021-06-14 NOTE — PROGRESS NOTES
"Assessment/Plan:        Diagnoses and all orders for this visit:    Calculus of kidney  24 hour litholink study x 2  Calculus of ureter    Urinary frequency      Stone Management Plan  [facility] Stone Management 12/14/2020 12/30/2020 1/6/2021   Urinary Tract Infection No suspicion of infection No suspicion of infection No suspicion of infection   Renal Colic Asymptomatic at this time Asymptomatic at this time Well controlled symptoms   Renal Failure No suspicion of renal failure No suspicion of renal failure Suspected Renal Failure   Current CT date 12/13/2020 - -   Right sided stones? Yes - Yes   R Number of ureteral stones 1 - 1   R GSD of ureteral stones 4 - 4   R Location of ureteral stone Proximal - Distal   R Number of kidney stones  1 - 1   R GSD of kidney stones 10 - 15 - 10 - 15   R Hydronephrosis Moderate - Mild   R Stone Event New event Established event Established event   Diagnosis date 12/13/2020 - -   Initial location of primary symptomatic stone Proximal - -   Initial GSD of primary symptomatic stone 4 - -   R MET status Initiation - Progression   R Current Plan MET - MET   MET 2 week F/U - (No Data)   Left sided stones? Yes - Yes   L Number of ureteral stones No ureteral stones - -   L Number of kidney stones  1 - Renal stones unchanged from last exam   L GSD of kidney stones 2 - 4 - -   L Hydronephrosis None - -   L Stone Event No current event No current event No current event   L Current Plan Observe - Observe   Observe rationale Limited stone burden with good prognosis for spontaneous passage - Limited stone burden with good prognosis for spontaneous passage             Phone call duration: 19 minutes    Dominguez Palmer MD     Subjective:      The patient has been notified of following:     \"This telephone visit will be conducted via a call between you and your physician/provider. We have found that certain health care needs can be provided without the need for a physical exam.  This service " "lets us provide the care you need with a short phone conversation.  If a prescription is necessary we can send it directly to your pharmacy.  If labs and/or imaging are needed, we can place orders so you can have the test (s) done at a later time.    If during the course of the call the physician/provider feels a telephone visit is not appropriate, you will not be charged for this service.\"     HPI  Mr. Sergio Long is a 55 y.o.  male who is being evaluated via a billable telephone visit by [facility] [place] for follow-up right ureter stone diagnosed 12/13/2020.  In addition 12 millimeter right renal stone  And a 2 mm left renal stone.  Ultrasound obtained 12/28/2020 not  helpfful.  Comes at this time with sxs of frequency variable.  . He is having no flank pain .  Patient Was to have CT scan with and without  Contrast  To evaluate ueteral stone and a complex RUP cyst.  Ct W/O contrast was done.    Personal review of CT W//O contrast demonstrates ureteral stone to be in intramural tunnel with no change in renal stones and mild hydro.  Complex cyst unchanged with HU 7-8.    IMP Right distal ureteral stone R renal stone left renal stone    R complex cyst, simple left renal cyst    Plan  Continue trial of passge ,   litholink stone study  Will get Ct w/o and with contrast and delayed runs in future.                       ROS   Review of systems is negative except for HPI.    Past Medical History:   Diagnosis Date     Sleep apnea        No past surgical history on file.    Current Outpatient Medications   Medication Sig Dispense Refill     CETIRIZINE HCL (ZYRTEC ORAL) Take 1 tablet by mouth daily as needed.       HYDROmorphone (DILAUDID) 2 MG tablet Take 1-2 tablets (2-4 mg total) by mouth every 4 (four) hours as needed for pain. 11 tablet 0     No current facility-administered medications for this visit.        No Known Allergies    Social History     Socioeconomic History     Marital status: "      Spouse name: Not on file     Number of children: Not on file     Years of education: Not on file     Highest education level: Not on file   Occupational History     Not on file   Social Needs     Financial resource strain: Not on file     Food insecurity     Worry: Not on file     Inability: Not on file     Transportation needs     Medical: Not on file     Non-medical: Not on file   Tobacco Use     Smoking status: Former Smoker     Types: Cigarettes     Smokeless tobacco: Current User     Types: Chew   Substance and Sexual Activity     Alcohol use: No     Drug use: Not on file     Sexual activity: Not on file   Lifestyle     Physical activity     Days per week: Not on file     Minutes per session: Not on file     Stress: Not on file   Relationships     Social connections     Talks on phone: Not on file     Gets together: Not on file     Attends Zoroastrian service: Not on file     Active member of club or organization: Not on file     Attends meetings of clubs or organizations: Not on file     Relationship status: Not on file     Intimate partner violence     Fear of current or ex partner: Not on file     Emotionally abused: Not on file     Physically abused: Not on file     Forced sexual activity: Not on file   Other Topics Concern     Not on file   Social History Narrative     Not on file       Family History   Problem Relation Age of Onset     Sleep apnea Father      Snoring Father      Sleep apnea Sister      Snoring Sister      Sleep apnea Sister      Snoring Sister      Sleep apnea Sister      Snoring Sister        Objective:      Labs

## 2021-06-16 NOTE — PROGRESS NOTES
Assessment/Plan:        Diagnoses and all orders for this visit:    Renal colic  -     HYDROmorphone (DILAUDID) 2 MG tablet; Take 1 tablet (2 mg total) by mouth every 6 (six) hours as needed for pain.  Dispense: 10 tablet; Refill: 0  -     Amb Referral to Bariatric Dietician    History of kidney stone  Right renal stone    High urine sodium  -     Amb Referral to Bariatric Dietician    Hyperoxaluria  -     Amb Referral to Bariatric Dietician    Hypercalciuria  -     Amb Referral to Bariatric Dietician            24-hour urine Litholink study 3 to 4 months  Other orders  -     metroNIDAZOLE (METROCREAM) 0.75 % cream; USE 1 APPLICATION AT BEDTIME TOPICALLY TO FACE AT BEDTIME      Stone Management Plan  [facility] Stone Management 12/30/2020 1/6/2021 3/26/2021   Urinary Tract Infection No suspicion of infection No suspicion of infection No suspicion of infection   Renal Colic Asymptomatic at this time Well controlled symptoms Asymptomatic at this time   Renal Failure No suspicion of renal failure Suspected Renal Failure No suspicion of renal failure   Current CT date - - -   Right sided stones? - Yes -   R Number of ureteral stones - 1 -   R GSD of ureteral stones - 4 -   R Location of ureteral stone - Distal -   R Number of kidney stones  - 1 -   R GSD of kidney stones - 10 - 15 -   R Hydronephrosis - Mild -   R Stone Event Established event Established event No current event   Diagnosis date - - -   Initial location of primary symptomatic stone - - -   Initial GSD of primary symptomatic stone - - -   R MET status - Progression Passed   R Current Plan - MET -   MET - (No Data) -   Left sided stones? - Yes -   L Number of ureteral stones - - -   L Number of kidney stones  - Renal stones unchanged from last exam -   L GSD of kidney stones - - -   L Hydronephrosis - - -   L Stone Event No current event No current event No current event   L Current Plan - Observe -   Observe rationale - Limited stone burden with good prognosis  "for spontaneous passage -             Phone call duration: 10 minutes  18 minutes spent chart review review test interpretation of tests placement of tests placement consult with another provider documentation total of 28 minutes     Dominguez Palmer MD     Subjective:      The patient has been notified of following:     \"This telephone visit will be conducted via a call between you and your physician/provider. We have found that certain health care needs can be provided without the need for a physical exam.  This service lets us provide the care you need with a short phone conversation.  If a prescription is necessary we can send it directly to your pharmacy.  If labs and/or imaging are needed, we can place orders so you can have the test (s) done at a later time.    If during the course of the call the physician/provider feels a telephone visit is not appropriate, you will not be charged for this service.\"     HPI  Mr. Sergio Long is a 55 y.o.  male who is being evaluated via a billable telephone visit by [facility] [place] for follow-up left ureteral stone the patient spontaneously in the interval but did not recover as he lost the toilet.  Patient has a known 12 x 6 mm stone in the right renal pelvis lower pole Hounsfield 940 units nonobstructing.    Patient comes at this time for follow-up of 24-hour urine stone studies.  He is completely asymptomatic without abdominal or flank pain or voiding symptoms.    Multiple serum calcium is normal starting out 2019, 9.6 2029.4 August and December 2000 29.2.    Urine volume normal at 2.9 L and 3.0 L saturation calcium oxalate 8.1 and 6.7 urine calcium level 639 and 510 oxalate 49 and 43 sodium 283 and 214 remainder of 24-hour urine unremarkable.    In discussing with patient he uses a great deal of salt in addition he loves cheese curds and eats a large amount of cheese which contains both calcium and sodium.    Impression large right renal " calculus nonobstructing at this time, recent spontaneous passage left ureteral stone  Risk factors hypercalciuria high urinary sodium hyperoxaluria    Plan elective clearance of stone right renal pelvis patient's pleasure  Dietary consultation to give patient counseled on low-sodium diet and low oxalate diet  Repeat 24-hour urine Litholink study 3 to 4 months       ROS   Review of systems is negative except for HPI.    Past Medical History:   Diagnosis Date     Sleep apnea        No past surgical history on file.    Current Outpatient Medications   Medication Sig Dispense Refill     CETIRIZINE HCL (ZYRTEC ORAL) Take 1 tablet by mouth daily as needed.       HYDROmorphone (DILAUDID) 2 MG tablet Take 1-2 tablets (2-4 mg total) by mouth every 4 (four) hours as needed for pain. 11 tablet 0     HYDROmorphone (DILAUDID) 2 MG tablet Take 1 tablet (2 mg total) by mouth every 6 (six) hours as needed for pain. 10 tablet 0     metroNIDAZOLE (METROCREAM) 0.75 % cream USE 1 APPLICATION AT BEDTIME TOPICALLY TO FACE AT BEDTIME       No current facility-administered medications for this visit.        No Known Allergies    Social History     Socioeconomic History     Marital status:      Spouse name: Not on file     Number of children: Not on file     Years of education: Not on file     Highest education level: Not on file   Occupational History     Not on file   Social Needs     Financial resource strain: Not on file     Food insecurity     Worry: Not on file     Inability: Not on file     Transportation needs     Medical: Not on file     Non-medical: Not on file   Tobacco Use     Smoking status: Former Smoker     Types: Cigarettes     Smokeless tobacco: Current User     Types: Chew   Substance and Sexual Activity     Alcohol use: No     Drug use: Not on file     Sexual activity: Not on file   Lifestyle     Physical activity     Days per week: Not on file     Minutes per session: Not on file     Stress: Not on file    Relationships     Social connections     Talks on phone: Not on file     Gets together: Not on file     Attends Gnosticism service: Not on file     Active member of club or organization: Not on file     Attends meetings of clubs or organizations: Not on file     Relationship status: Not on file     Intimate partner violence     Fear of current or ex partner: Not on file     Emotionally abused: Not on file     Physically abused: Not on file     Forced sexual activity: Not on file   Other Topics Concern     Not on file   Social History Narrative     Not on file       Family History   Problem Relation Age of Onset     Sleep apnea Father      Snoring Father      Sleep apnea Sister      Snoring Sister      Sleep apnea Sister      Snoring Sister      Sleep apnea Sister      Snoring Sister        Objective:      Labs   Stone prevention labs 24 hour urine No results found for: [id number], CALCIUMUR, IK72WHB, [id number], FTNQN49R, LABPH, LABURIN

## 2021-06-16 NOTE — ANESTHESIA CARE TRANSFER NOTE
Last vitals:   Vitals:    04/09/21 0952   BP: (!) 164/102   Pulse: 78   Resp: 16   Temp: 36.5  C (97.7  F)   SpO2: 95%     Patient's level of consciousness is drowsy  Spontaneous respirations: yes  Maintains airway independently: yes  Dentition unchanged: yes  Oropharynx: oropharynx clear of all foreign objects    QCDR Measures:  ASA# 20 - Surgical Safety Checklist: WHO surgical safety checklist completed prior to induction    PQRS# 430 - Adult PONV Prevention: 4558F - Pt received => 2 anti-emetic agents (different classes) preop & intraop  ASA# 8 - Peds PONV Prevention: NA - Not pediatric patient, not GA or 2 or more risk factors NOT present  PQRS# 424 - [facility]-op Temp Management: 4559F - At least one body temp DOCUMENTED => 35.5C or 95.9F within required timeframe  PQRS# 426 - PACU Transfer Protocol: - Transfer of care checklist used  ASA# 14 - Acute Post-op Pain: ASA14B - Patient did NOT experience pain >= 7 out of 10

## 2021-06-16 NOTE — ANESTHESIA PREPROCEDURE EVALUATION
Anesthesia Evaluation      Patient summary reviewed   No history of anesthetic complications     Airway   Mallampati: III  Neck ROM: full   Pulmonary     breath sounds clear to auscultation  (+) sleep apnea on CPAP, ,                          Cardiovascular - negative ROS  Exercise tolerance: > or = 4 METS  Rhythm: regular        Neuro/Psych - negative ROS     Endo/Other    (+) obesity,      GI/Hepatic/Renal    Chronic renal disease: stones.     Other findings:     NPO > 8 hrs       Results for ARLEN ERIKA ELLE (MRN [id number]) as of 4/9/2021 4/6/2021 15:41  SARS-CoV-2 PCR Result: NEGATIVE        Dental - normal exam                        Anesthesia Plan  Planned anesthetic: general LMA        Ketamine  Robinul  Zofran, decadron 10 mg      ASA 3   Induction: intravenous   Anesthetic plan and risks discussed with: patient  Anesthesia plan special considerations: antiemetics,   Post-op plan: routine recovery

## 2021-06-16 NOTE — ANESTHESIA POSTPROCEDURE EVALUATION
Patient: Sergio Long  Procedure(s):  CYSTOURETEROSCOPY, WITH RETROGRADE PYELOGRAM, HOLMIUM LASER LITHOTRIPSY OF URETERAL CALCULUS, AND STENT INSERTION RIGHT (Right)  Anesthesia type: general    Patient location: Phase II Recovery  Last vitals:   Vitals Value Taken Time   /94 04/09/21 1057   Temp 36.4  C (97.6  F) 04/09/21 1020   Pulse 54 04/09/21 1108   Resp 16 04/09/21 1030   SpO2 93 % 04/09/21 1108   Vitals shown include unvalidated device data.  Post vital signs: stable  Level of consciousness: awake and responds to simple questions  Post-anesthesia pain: pain controlled  Post-anesthesia nausea and vomiting: no  Pulmonary: unassisted, return to baseline  Cardiovascular: stable and blood pressure at baseline  Hydration: adequate  Anesthetic events: no    QCDR Measures:  ASA# 11 - [facility]ine-op Cardiac Arrest: ASA11B - Patient did NOT experience unanticipated cardiac arrest  ASA# 12 - [facility]ine-op Mortality Rate: ASA12B - Patient did NOT die  ASA# 13 - PACU Re-Intubation Rate: ASA13B - Patient did NOT require a new airway mgmt  ASA# 10 - Composite Anes Safety: ASA10A - No serious adverse event    Additional Notes:

## 2021-06-16 NOTE — PROGRESS NOTES
Patient was able to remove his stent without problem and is feeling well.  He will call back with any issues or questions and is aware of possible flank pain in the next couple hours.  Neema Portillo RN

## 2021-06-17 NOTE — PROGRESS NOTES
Assessment/Plan:        Diagnoses and all orders for this visit:    Calyceal diverticulum  -     CT Abdomen Pelvis Without Oral With Without IV Contrast; Future; Expected date: 08/07/2021  -     Urinalysis Macro & Micro; Future; Expected date: 05/14/2021    History of kidney stones  -     CT Abdomen Pelvis Without Oral With Without IV Contrast; Future; Expected date: 08/07/2021  -     Urinalysis Macro & Micro; Future; Expected date: 05/14/2021    Hyperoxaluria low oxalate diet    Hypercalciuria low-sodium diet    High urine sodium  PTH  Repeat 24-hour urine Litholink study 3 months    Stone Management Plan  [facility] [place] [facility] 1/6/2021 3/26/2021 5/7/2021   Urinary Tract Infection No suspicion of infection No suspicion of infection No suspicion of infection   Renal Colic Well controlled symptoms Asymptomatic at this time Asymptomatic at this time   Renal Failure Suspected Renal Failure No suspicion of renal failure No suspicion of renal failure   Current CT date - - -   Right sided stones? Yes - Yes   R Number of ureteral stones 1 - No ureteral stones   R GSD of ureteral stones 4 - -   R Location of ureteral stone Distal - -   R Number of kidney stones  1 - 1   R GSD of kidney stones 10 - 15 - < 2   R Hydronephrosis Mild - None   R Stone Event Established event No current event No current event   Diagnosis date - - -   Initial location of primary symptomatic stone - - -   Initial GSD of primary symptomatic stone - - -   R MET status Progression Passed -   R Current Plan MET - -   MET (No Data) - -   Left sided stones? Yes - Yes   L Number of ureteral stones - - -   L Number of kidney stones  Renal stones unchanged from last exam - 1   L GSD of kidney stones - - 2 - 4   L Hydronephrosis - - None   L Stone Event No current event No current event -   L Current Plan Observe - -   Observe rationale Limited stone burden with good prognosis for spontaneous passage - -             Phone call duration: 26 minutes  12 minutes  "spent in chart to review of tests interpretation of tests ordering tests discussion with Dr. Sales and documentation    Dominguez Palmer MD     Subjective:      The patient has been notified of following:     \"This telephone visit will be conducted via a call between you and your physician/provider. We have found that certain health care needs can be provided without the need for a physical exam.  This service lets us provide the care you need with a short phone conversation.  If a prescription is necessary we can send it directly to your pharmacy.  If labs and/or imaging are needed, we can place orders so you can have the test (s) done at a later time.    If during the course of the call the physician/provider feels a telephone visit is not appropriate, you will not be charged for this service.\"     HPI  Mr. Sergio Long is a 55 y.o.  male who is being evaluated via a billable telephone visit by [facility] for follow-up right laser lithotripsy with Dr. Sales with stent out 4/15/2021.  Patient had preceded this with stone risk studies demonstrating calcium level elevated at 639 and 510 oxalate elevated at 49 and 43 sodium elevated at 283 and 214..   Serum calcium normal at 9.2   should be noted stone analysis was 50% calcium 50% calcium phosphate apatite.  Patient subsequently saw dietitian for advice on low-sodium diet and oxalate restriction.    Patient comes at this time postop asymptomatic without voiding symptoms of urgency or frequency or flank pain.  He passed a fair amount of gravel he states after the stent was removed.    Personal review of CT scan without contrast accomplished 5/6/2020 demonstrates the cystic lesion in the right upper pole canal containing layering out radiopaque density consisting most likely of stone dust.  Renal stone is gone although there appears to be a possible millimeter stone in the lower lower pole.  The previously thought to be thick-walled " cyst I feel is calyceal diverticulum that in the course of the laser lithotripsy with pressure alone for fluids into the cyst dilate the passageway allow for dust like material going up and then at this point the diverticulum is decompressed.  In addition there is 1 to 2 mm stone in the left lower pole.    Impression postop right laser lithotripsy large right renal pelvis stone with subsequent residual debris and what was thought to be a cyst but I believe out to be a upper pole diverticulum is decompressed with calcific debris present  Risk factors of hypercalciuria hyperoxaluria elevated urinary sodium    Plan follow-up CT 3 months  24-hour urine Litholink study 3 months if calcium not down significantly consider adding chlorthalidone  Check parathyroid hormone level    Discussed with Dr. Sales the above findings.             ROS   Review of systems is negative except for HPI.    Past Medical History:   Diagnosis Date     Cancer (H)      Sleep apnea        Past Surgical History:   Procedure Laterality Date     foot surgery Rt foot X4 Right      SD CYSTO/URETERO W/LITHOTRIPSY &INDWELL STENT INSRT Right 4/9/2021    Procedure: CYSTOURETEROSCOPY, WITH RETROGRADE PYELOGRAM, HOLMIUM LASER LITHOTRIPSY OF URETERAL CALCULUS, AND STENT INSERTION RIGHT;  Surgeon: Eduardo Sales MD;  Location: [facility];  Service: Urology     SKIN CANCER EXCISION      on back       No current outpatient medications on file.     No current facility-administered medications for this visit.        Allergies   Allergen Reactions     Hydrocodone Hives     Oxycodone Hives     Including Percocet       Social History     Socioeconomic History     Marital status:      Spouse name: Not on file     Number of children: Not on file     Years of education: Not on file     Highest education level: Not on file   Occupational History     Not on file   Social Needs     Financial resource strain: Not on file     Food insecurity     Worry: Not  on file     Inability: Not on file     Transportation needs     Medical: Not on file     Non-medical: Not on file   Tobacco Use     Smoking status: Former Smoker     Types: Cigarettes     Quit date: 4/9/2011     Years since quitting: 10.0     Smokeless tobacco: Current User     Types: Chew   Substance and Sexual Activity     Alcohol use: No     Drug use: Not on file     Sexual activity: Not on file   Lifestyle     Physical activity     Days per week: Not on file     Minutes per session: Not on file     Stress: Not on file   Relationships     Social connections     Talks on phone: Not on file     Gets together: Not on file     Attends Restoration service: Not on file     Active member of club or organization: Not on file     Attends meetings of clubs or organizations: Not on file     Relationship status: Not on file     Intimate partner violence     Fear of current or ex partner: Not on file     Emotionally abused: Not on file     Physically abused: Not on file     Forced sexual activity: Not on file   Other Topics Concern     Not on file   Social History Narrative     Not on file       Family History   Problem Relation Age of Onset     Sleep apnea Father      Snoring Father      Sleep apnea Sister      Snoring Sister      Sleep apnea Sister      Snoring Sister      Sleep apnea Sister      Snoring Sister        Objective:      Labs   Stone prevention labs Calcium metabolism No results found for: CAIONMEAS   No results found for: PTH  No results found for: [id number]    and 24 hour urine No results found for: [id number], CALCIUMUR, BY43PNA, [id number], WICZP16J, LABPH, LABURIN

## 2021-06-17 NOTE — PROGRESS NOTES
Patient roomed via telephone for a virtual visit.  He is being seen for a one month post stone removal with a CT scan.  Patient confirmed he is in the [facility] at the time of this appointment.

## 2021-07-03 NOTE — ADDENDUM NOTE
Addendum Note by Blayne Ibarra RN at 12/23/2020  9:05 AM     Author: Blayne Ibarra RN Service: -- Author Type: Registered Nurse    Filed: 12/23/2020  9:26 AM Encounter Date: 12/23/2020 Status: Signed    : Blayne Ibarra RN (Registered Nurse)    Addended by: BLAYNE IBARRA on: 12/23/2020 09:26 AM        Modules accepted: Orders

## 2021-10-11 ENCOUNTER — HEALTH MAINTENANCE LETTER (OUTPATIENT)
Age: 56
End: 2021-10-11

## 2022-05-22 ENCOUNTER — HEALTH MAINTENANCE LETTER (OUTPATIENT)
Age: 57
End: 2022-05-22

## 2022-09-24 ENCOUNTER — HEALTH MAINTENANCE LETTER (OUTPATIENT)
Age: 57
End: 2022-09-24

## 2023-04-13 ENCOUNTER — LAB REQUISITION (OUTPATIENT)
Dept: LAB | Facility: CLINIC | Age: 58
End: 2023-04-13
Payer: COMMERCIAL

## 2023-04-13 DIAGNOSIS — Z13.220 ENCOUNTER FOR SCREENING FOR LIPOID DISORDERS: ICD-10-CM

## 2023-04-13 DIAGNOSIS — Z87.442 PERSONAL HISTORY OF URINARY CALCULI: ICD-10-CM

## 2023-04-13 DIAGNOSIS — Z12.5 ENCOUNTER FOR SCREENING FOR MALIGNANT NEOPLASM OF PROSTATE: ICD-10-CM

## 2023-04-13 PROCEDURE — 80061 LIPID PANEL: CPT | Mod: ORL | Performed by: FAMILY MEDICINE

## 2023-04-13 PROCEDURE — 80053 COMPREHEN METABOLIC PANEL: CPT | Mod: ORL | Performed by: FAMILY MEDICINE

## 2023-04-13 PROCEDURE — G0103 PSA SCREENING: HCPCS | Mod: ORL | Performed by: FAMILY MEDICINE

## 2023-04-14 LAB
ALBUMIN SERPL BCG-MCNC: 4 G/DL (ref 3.5–5.2)
ALP SERPL-CCNC: 67 U/L (ref 40–129)
ALT SERPL W P-5'-P-CCNC: 25 U/L (ref 10–50)
ANION GAP SERPL CALCULATED.3IONS-SCNC: 12 MMOL/L (ref 7–15)
AST SERPL W P-5'-P-CCNC: 29 U/L (ref 10–50)
BILIRUB SERPL-MCNC: 0.7 MG/DL
BUN SERPL-MCNC: 11.6 MG/DL (ref 6–20)
CALCIUM SERPL-MCNC: 9.6 MG/DL (ref 8.6–10)
CHLORIDE SERPL-SCNC: 106 MMOL/L (ref 98–107)
CHOLEST SERPL-MCNC: 161 MG/DL
CREAT SERPL-MCNC: 0.98 MG/DL (ref 0.67–1.17)
DEPRECATED HCO3 PLAS-SCNC: 23 MMOL/L (ref 22–29)
GFR SERPL CREATININE-BSD FRML MDRD: >90 ML/MIN/1.73M2
GLUCOSE SERPL-MCNC: 77 MG/DL (ref 70–99)
HDLC SERPL-MCNC: 46 MG/DL
LDLC SERPL CALC-MCNC: 98 MG/DL
NONHDLC SERPL-MCNC: 115 MG/DL
POTASSIUM SERPL-SCNC: 4.1 MMOL/L (ref 3.4–5.3)
PROT SERPL-MCNC: 6.8 G/DL (ref 6.4–8.3)
PSA SERPL DL<=0.01 NG/ML-MCNC: 1.31 NG/ML (ref 0–3.5)
SODIUM SERPL-SCNC: 141 MMOL/L (ref 136–145)
TRIGL SERPL-MCNC: 86 MG/DL

## 2023-06-04 ENCOUNTER — HEALTH MAINTENANCE LETTER (OUTPATIENT)
Age: 58
End: 2023-06-04

## 2023-10-31 ASSESSMENT — SLEEP AND FATIGUE QUESTIONNAIRES
HOW LIKELY ARE YOU TO NOD OFF OR FALL ASLEEP WHILE SITTING QUIETLY AFTER LUNCH WITHOUT ALCOHOL: WOULD NEVER DOZE
HOW LIKELY ARE YOU TO NOD OFF OR FALL ASLEEP IN A CAR, WHILE STOPPED FOR A FEW MINUTES IN TRAFFIC: WOULD NEVER DOZE
HOW LIKELY ARE YOU TO NOD OFF OR FALL ASLEEP WHILE LYING DOWN TO REST IN THE AFTERNOON WHEN CIRCUMSTANCES PERMIT: HIGH CHANCE OF DOZING
HOW LIKELY ARE YOU TO NOD OFF OR FALL ASLEEP WHILE WATCHING TV: WOULD NEVER DOZE
HOW LIKELY ARE YOU TO NOD OFF OR FALL ASLEEP WHEN YOU ARE A PASSENGER IN A CAR FOR AN HOUR WITHOUT A BREAK: WOULD NEVER DOZE
HOW LIKELY ARE YOU TO NOD OFF OR FALL ASLEEP WHILE SITTING AND READING: WOULD NEVER DOZE
HOW LIKELY ARE YOU TO NOD OFF OR FALL ASLEEP WHILE SITTING INACTIVE IN A PUBLIC PLACE: WOULD NEVER DOZE
HOW LIKELY ARE YOU TO NOD OFF OR FALL ASLEEP WHILE SITTING AND TALKING TO SOMEONE: WOULD NEVER DOZE

## 2023-11-01 ENCOUNTER — VIRTUAL VISIT (OUTPATIENT)
Dept: SLEEP MEDICINE | Facility: CLINIC | Age: 58
End: 2023-11-01
Payer: COMMERCIAL

## 2023-11-01 VITALS — HEIGHT: 77 IN | BODY MASS INDEX: 37.19 KG/M2 | WEIGHT: 315 LBS

## 2023-11-01 DIAGNOSIS — G47.33 OSA ON CPAP: Primary | ICD-10-CM

## 2023-11-01 DIAGNOSIS — G47.30 OBESITY WITH SLEEP APNEA: ICD-10-CM

## 2023-11-01 DIAGNOSIS — E66.9 OBESITY WITH SLEEP APNEA: ICD-10-CM

## 2023-11-01 PROCEDURE — 99214 OFFICE O/P EST MOD 30 MIN: CPT | Mod: 95 | Performed by: INTERNAL MEDICINE

## 2023-11-01 ASSESSMENT — PAIN SCALES - GENERAL: PAINLEVEL: NO PAIN (0)

## 2023-11-01 NOTE — NURSING NOTE
Has patient had flu shot for current/most recent flu season? If so, when? No     Is the patient currently in the state of [place]? YES    Visit mode:VIDEO    If the visit is dropped, the patient can be reconnected by: VIDEO VISIT: Text to cell phone:   Telephone Information:   Mobile [phone]       Will anyone else be joining the visit? NO  (If patient encounters technical issues they should call [phone]    How would you like to obtain your AVS? MyChart    Are changes needed to the allergy or medication list? No    Reason for visit: RECHECK    No other vitals to report per pt    Lilo Cotton VVF

## 2023-11-01 NOTE — PROGRESS NOTES
Virtual Visit Details    Type of service:  Video Visit     Originating Location (pt. Location): Home    Distant Location (provider location):  Off-site  Platform used for Video Visit: [facility]  Sleep clinic follow-up visit note     Date: 11/1/23     Chief complaint:  Follow-up of TORRES, review CPAP compliance measures     Sergio Long is a 58 year old male with medical history significant for obstructive sleep apnea, basal cell carcinoma of the neck, malignant melanoma of the trunk, who presents to sleep clinic for follow-up of previously diagnosed TORRES that is currently managed with CPAP therapy    Previous sleep study report:   -Split-night PSG on November 4, 2015.  BMI 41.3.  AHI 19.  CPAP titrated from 5 up to 9 cm of water.  No PLM's observed.    He was last seen at the sleep clinic by Dr. Wagner on November 23, 2020, when DME orders were generated for replacement auto CPAP.  He subsequently obtained a replacement CPAP device through  [facility] and he would like to switch the DME vendor to [facility].    Pressure settings on CPAP ranged between 8 to 13.4 cmH2O  Patient reports using the CPAP regularly during sleep. He uses full face mask.   He reports that for the past few months there has been a buzzing loud noise from his CPAP device.  There are no reports of snoring, apneas or awakenings due to gasping with the device use.   Pressure settings feel comfortable.  Patient reports good sleep quality with the device use.   Patient denies nonrestorative sleep, fatigue or excessive daytime sleepiness. ESS: 3 /24.  There are no reports of  drowsiness while driving.         DOWNLOADABLE COMPLIANCE DATA: (ResMed) from October 2, 2023 through October 31, 2023  Reveals that patient to use the device for 25 out of 30 days (83% compliance with device usage greater than or equal to 4 hours )with an average use of 7 hours and 55  minutes on the days used. 95th percentile on leak  was 0 L/min. Residual AHI was 0.3 per hour. Median pressure settings: 9.0; 95th percentile : 11.2 and max pressure: 12.4 cm water      Past medical/surgical history, family history, social history, medications and allergies were reviewed.     Problem list:  Patient Active Problem List   Diagnosis    Malignant melanoma of trunk (H)    Basal cell carcinoma of neck             Physical Examination:   General: Pleasant. Cooperative. In no apparent distress.  Pulmonary: Able to speak in full sentences easily. No cough or wheeze.   Neurologic: Alert, oriented x3.  Psychiatric: Mood euthymic. Affect congruent with full range and intensity.     ASSESSMENT/PLAN:  Obstructive sleep apnea: Pt reports adequate compliance with CPAP and reports positive benefits with CPAP treatment. Based on compliance measures, TORRES is adequately controlled with CPAP at the current settings.    DME orders were generated  for renewal of CPAP supplies.  Patient expressed interest in switching the DME vendor to [facility].  Patient was instructed to take his CPAP machine to the [facility] to get a diagnostic check on the device(since he reports that for past few months there has been a buzzing loud noise from his CPAP device).  Recommended to continue using the CPAP regularly during sleep and getting the supplies for the CPAP replaced regularly.   Patient instructed to remember to bring PAP with him if hospitalized and if anticipating procedure that requires sedation/surgery to be sure to discuss with the provider/surgeon that he has sleep apnea and uses PAP therapy.     Obesity: We discussed weight management with healthy diet, and exercise.     Patient was strongly advised to avoid driving, operating any heavy machinery or other hazardous situations while drowsy or sleepy.  Patient was counseled on the importance of driving while alert, to pull over if drowsy, or nap before getting into the vehicle if sleepy.     "  Follow-up with sleep clinic via video visit in 1 year  or sooner if any concerns.     The above note was dictated using voice recognition software. Although reviewed after completion, some word and grammatical error may remain . Please contact the author for any clarifications.      \" Total time spent was 30 minutes  for this appointment on this date of service which include time spent before, during and after the visit for chart review, patient care, counseling and coordination of care. Including documentation\"      Zita Moss MD  [facility]  [address] , [place] [address]      "

## 2024-07-14 ENCOUNTER — HEALTH MAINTENANCE LETTER (OUTPATIENT)
Age: 59
End: 2024-07-14

## 2025-01-03 ENCOUNTER — LAB REQUISITION (OUTPATIENT)
Dept: LAB | Facility: CLINIC | Age: 60
End: 2025-01-03

## 2025-01-03 DIAGNOSIS — R60.0 LOCALIZED EDEMA: ICD-10-CM

## 2025-01-03 DIAGNOSIS — Z00.00 ENCOUNTER FOR GENERAL ADULT MEDICAL EXAMINATION WITHOUT ABNORMAL FINDINGS: ICD-10-CM

## 2025-01-03 PROCEDURE — 80053 COMPREHEN METABOLIC PANEL: CPT | Performed by: FAMILY MEDICINE

## 2025-01-03 PROCEDURE — 86803 HEPATITIS C AB TEST: CPT | Performed by: FAMILY MEDICINE

## 2025-01-03 PROCEDURE — 84443 ASSAY THYROID STIM HORMONE: CPT | Performed by: FAMILY MEDICINE

## 2025-01-03 PROCEDURE — G0103 PSA SCREENING: HCPCS | Performed by: FAMILY MEDICINE

## 2025-01-04 LAB
ALBUMIN SERPL BCG-MCNC: 4.1 G/DL (ref 3.5–5.2)
ALP SERPL-CCNC: 69 U/L (ref 40–150)
ALT SERPL W P-5'-P-CCNC: 22 U/L (ref 0–70)
ANION GAP SERPL CALCULATED.3IONS-SCNC: 11 MMOL/L (ref 7–15)
AST SERPL W P-5'-P-CCNC: 29 U/L (ref 0–45)
BILIRUB SERPL-MCNC: 0.8 MG/DL
BUN SERPL-MCNC: 10.1 MG/DL (ref 6–20)
CALCIUM SERPL-MCNC: 9.5 MG/DL (ref 8.8–10.4)
CHLORIDE SERPL-SCNC: 105 MMOL/L (ref 98–107)
CREAT SERPL-MCNC: 0.98 MG/DL (ref 0.67–1.17)
EGFRCR SERPLBLD CKD-EPI 2021: 89 ML/MIN/1.73M2
GLUCOSE SERPL-MCNC: 88 MG/DL (ref 70–99)
HCO3 SERPL-SCNC: 24 MMOL/L (ref 22–29)
HCV AB SERPL QL IA: NONREACTIVE
POTASSIUM SERPL-SCNC: 4.2 MMOL/L (ref 3.4–5.3)
PROT SERPL-MCNC: 7.2 G/DL (ref 6.4–8.3)
PSA SERPL DL<=0.01 NG/ML-MCNC: 1.57 NG/ML (ref 0–3.5)
SODIUM SERPL-SCNC: 140 MMOL/L (ref 135–145)
TSH SERPL DL<=0.005 MIU/L-ACNC: 0.93 UIU/ML (ref 0.3–4.2)

## 2025-07-19 ENCOUNTER — HEALTH MAINTENANCE LETTER (OUTPATIENT)
Age: 60
End: 2025-07-19